# Patient Record
Sex: FEMALE | Race: WHITE | Employment: FULL TIME | URBAN - METROPOLITAN AREA
[De-identification: names, ages, dates, MRNs, and addresses within clinical notes are randomized per-mention and may not be internally consistent; named-entity substitution may affect disease eponyms.]

---

## 2019-02-25 ENCOUNTER — TELEPHONE (OUTPATIENT)
Dept: OBGYN CLINIC | Facility: CLINIC | Age: 34
End: 2019-02-25

## 2019-02-25 NOTE — TELEPHONE ENCOUNTER
Pt calling to report +HPT and LMP of 1/20. Pt stated her cycles are usually every 28-36 days. Pt is not on a PNV yet and pt was advised to start pnv with dha, folic acid, and iron.  Pt informed we have male and female providers and she will see all of them

## 2019-03-01 ENCOUNTER — OFFICE VISIT (OUTPATIENT)
Dept: OBGYN CLINIC | Facility: CLINIC | Age: 34
End: 2019-03-01
Payer: COMMERCIAL

## 2019-03-01 VITALS — SYSTOLIC BLOOD PRESSURE: 117 MMHG | HEART RATE: 78 BPM | WEIGHT: 151.63 LBS | DIASTOLIC BLOOD PRESSURE: 76 MMHG

## 2019-03-01 DIAGNOSIS — Z32.00 PREGNANCY EXAMINATION OR TEST, PREGNANCY UNCONFIRMED: ICD-10-CM

## 2019-03-01 DIAGNOSIS — O36.80X0 PREGNANCY WITH UNCERTAIN FETAL VIABILITY, SINGLE OR UNSPECIFIED FETUS: Primary | ICD-10-CM

## 2019-03-01 LAB
CONTROL LINE PRESENT WITH A CLEAR BACKGROUND (YES/NO): YES YES/NO
KIT LOT #: NORMAL NUMERIC
PREGNANCY TEST, URINE: YES

## 2019-03-01 PROCEDURE — 99202 OFFICE O/P NEW SF 15 MIN: CPT | Performed by: OBSTETRICS & GYNECOLOGY

## 2019-03-01 PROCEDURE — 81025 URINE PREGNANCY TEST: CPT | Performed by: OBSTETRICS & GYNECOLOGY

## 2019-03-04 NOTE — PROGRESS NOTES
Ambrose Bajwa is a 34year old female  Patient's last menstrual period was 2019. Patient presents with:  Gyn Problem: POSITIVE PREGNANCY TEST- NEW PATIENT    New pt. Newly pregnant. LMP 19. EGA 5+ wk. Menses q 28-36 days.   No bleedi

## 2019-03-06 ENCOUNTER — TELEPHONE (OUTPATIENT)
Dept: OBGYN CLINIC | Facility: CLINIC | Age: 34
End: 2019-03-06

## 2019-03-06 NOTE — TELEPHONE ENCOUNTER
Pt states she was feeling nauseous today. Was able to eat and drink a little bit today. About and hour and a half ago she ate some crackers and vomited. Pt states she tried to drink a little water and threw that up as well. Pt denies any fever. Pt states she did eat a popsicle after vomiting and has kept that down so far. Pt instructed to wait a few hours before trying to eat or drink anything in case her stomach is still irritated. Pt told to then try to drink small sips of anything she can keep down. If she is unable to keep down any liquids for about 8 hours, pt instructed to call us back. Pt also given instructions on taking vitamin B6 25mg 4 times a day and 1/2 tab of unisom for the nausea. Message to [facility] on call for any further recs and sign off.

## 2019-03-07 NOTE — TELEPHONE ENCOUNTER
Paged while on call. Returned page. Pt with vomiting today. She has vomited three times this evening. Reviewed for her to rest and give her stomach a break. She feels fine. If she continues to vomit or is feeling dehydrated can go to ER for IVFs.

## 2019-03-07 NOTE — TELEPHONE ENCOUNTER
Called pt for update. Pt stated that she feels much better then when she spoke with Parish Triplett last night. Pt stated she has been drinking gatorade which has been helping and she has been able to keep that down. Pt stated she has also eaten \"dry waffles\".  [facility]

## 2019-03-08 ENCOUNTER — HOSPITAL ENCOUNTER (OUTPATIENT)
Dept: ULTRASOUND IMAGING | Age: 34
Discharge: HOME | End: 2019-03-08
Attending: OBSTETRICS & GYNECOLOGY
Payer: COMMERCIAL

## 2019-03-08 DIAGNOSIS — O36.80X0 PREGNANCY WITH UNCERTAIN FETAL VIABILITY, SINGLE OR UNSPECIFIED FETUS: ICD-10-CM

## 2019-03-08 PROCEDURE — 76817 TRANSVAGINAL US OBSTETRIC: CPT | Performed by: OBSTETRICS & GYNECOLOGY

## 2019-03-08 PROCEDURE — 76801 OB US < 14 WKS SINGLE FETUS: CPT | Performed by: OBSTETRICS & GYNECOLOGY

## 2019-03-19 ENCOUNTER — TELEPHONE (OUTPATIENT)
Dept: OBGYN CLINIC | Facility: CLINIC | Age: 34
End: 2019-03-19

## 2019-03-19 NOTE — TELEPHONE ENCOUNTER
Pt states she feels \"much much better and I think I had a bug because it only lasted for 2 days\". Informed pt her JEZ 10/26/19. Pt has OBN scheduled for 4/6/19 at 10am. Pt will be 11wks on 4/6/19.  Pt states she can only do evening appts after 4pm or Satu

## 2019-03-19 NOTE — TELEPHONE ENCOUNTER
----- Message from Ligia Kirk MD sent at 3/19/2019 10:48 AM CDT -----  Call for wellness check-- noticed in chart that she had morning sickness.   Don't see any future appt

## 2019-03-23 ENCOUNTER — NURSE ONLY (OUTPATIENT)
Dept: OBGYN CLINIC | Facility: CLINIC | Age: 34
End: 2019-03-23
Payer: COMMERCIAL

## 2019-03-23 VITALS — BODY MASS INDEX: 26.84 KG/M2 | WEIGHT: 153.38 LBS | HEIGHT: 63.5 IN

## 2019-03-23 DIAGNOSIS — Z34.81 ENCOUNTER FOR SUPERVISION OF OTHER NORMAL PREGNANCY IN FIRST TRIMESTER: Primary | ICD-10-CM

## 2019-03-23 NOTE — PROGRESS NOTES
Pt seen for OBN appt today with no complaints. 1 hr gtt (GTN DM hx), hep C and Normal PN labs ordered. Pt advised all labs must be completed and resulted prior to MD appt.   Assisted pt with scheduling NPN appt with MD.    Robet Apgar name is John Ortiz child with birth defects not listed above No    Previous miscarriages or stillborn No        MISC    Infant vaccinations  Yes

## 2019-03-31 ENCOUNTER — LAB ENCOUNTER (OUTPATIENT)
Dept: LAB | Facility: HOSPITAL | Age: 34
End: 2019-03-31
Attending: OBSTETRICS & GYNECOLOGY
Payer: COMMERCIAL

## 2019-03-31 DIAGNOSIS — Z34.81 ENCOUNTER FOR SUPERVISION OF OTHER NORMAL PREGNANCY IN FIRST TRIMESTER: ICD-10-CM

## 2019-03-31 PROCEDURE — 87086 URINE CULTURE/COLONY COUNT: CPT

## 2019-03-31 PROCEDURE — 86803 HEPATITIS C AB TEST: CPT

## 2019-03-31 PROCEDURE — 86900 BLOOD TYPING SEROLOGIC ABO: CPT

## 2019-03-31 PROCEDURE — 87340 HEPATITIS B SURFACE AG IA: CPT

## 2019-03-31 PROCEDURE — 82950 GLUCOSE TEST: CPT

## 2019-03-31 PROCEDURE — 86780 TREPONEMA PALLIDUM: CPT

## 2019-03-31 PROCEDURE — 86850 RBC ANTIBODY SCREEN: CPT

## 2019-03-31 PROCEDURE — 87389 HIV-1 AG W/HIV-1&-2 AB AG IA: CPT

## 2019-03-31 PROCEDURE — 86901 BLOOD TYPING SEROLOGIC RH(D): CPT

## 2019-03-31 PROCEDURE — 86762 RUBELLA ANTIBODY: CPT

## 2019-03-31 PROCEDURE — 85025 COMPLETE CBC W/AUTO DIFF WBC: CPT

## 2019-03-31 PROCEDURE — 36415 COLL VENOUS BLD VENIPUNCTURE: CPT

## 2019-04-08 ENCOUNTER — INITIAL PRENATAL (OUTPATIENT)
Dept: OBGYN CLINIC | Facility: CLINIC | Age: 34
End: 2019-04-08
Payer: COMMERCIAL

## 2019-04-08 ENCOUNTER — TELEPHONE (OUTPATIENT)
Dept: OBGYN CLINIC | Facility: CLINIC | Age: 34
End: 2019-04-08

## 2019-04-08 VITALS
WEIGHT: 154 LBS | BODY MASS INDEX: 27 KG/M2 | HEART RATE: 78 BPM | DIASTOLIC BLOOD PRESSURE: 72 MMHG | SYSTOLIC BLOOD PRESSURE: 110 MMHG

## 2019-04-08 DIAGNOSIS — Z12.4 SCREENING FOR MALIGNANT NEOPLASM OF CERVIX: ICD-10-CM

## 2019-04-08 DIAGNOSIS — Z34.91 ENCOUNTER FOR SUPERVISION OF NORMAL PREGNANCY IN FIRST TRIMESTER, UNSPECIFIED GRAVIDITY: Primary | ICD-10-CM

## 2019-04-08 PROCEDURE — 81002 URINALYSIS NONAUTO W/O SCOPE: CPT | Performed by: OBSTETRICS & GYNECOLOGY

## 2019-04-09 NOTE — TELEPHONE ENCOUNTER
PT WANTS A NOTE REQUESTING THAT IT IS OK FOR PT TO GET UP EVERY HOUR OR SO TO MOVE AROUND AND GO TO THE BATHROOM FOR WORK. PT WOULD LIKE NOTE TO BE SENT THROUGH [employer].  NEEDED BY 4/9/19 @ 9:00AM

## 2019-04-14 ENCOUNTER — PATIENT MESSAGE (OUTPATIENT)
Dept: OBGYN CLINIC | Facility: CLINIC | Age: 34
End: 2019-04-14

## 2019-04-14 DIAGNOSIS — Z3A.12 12 WEEKS GESTATION OF PREGNANCY: Primary | ICD-10-CM

## 2019-04-15 NOTE — TELEPHONE ENCOUNTER
From: Garrett Shone  To: Rudy Peace MD  Sent: 4/14/2019 1:26 PM CDT  Subject: Non-Urgent Medical Question    If not too late, we'd like to schedule for the genetics testing. Please let me know if there is time to still do so.  Also if it is still poss

## 2019-04-16 ENCOUNTER — TELEPHONE (OUTPATIENT)
Dept: OBGYN CLINIC | Facility: CLINIC | Age: 34
End: 2019-04-16

## 2019-04-16 NOTE — TELEPHONE ENCOUNTER
Pt wants to know if genetic testing and 1st trimester screening are the same. She needs to schedule for 1st tri screening.

## 2019-04-16 NOTE — TELEPHONE ENCOUNTER
I CALLED AND SPOKE WITH PT AND EXPLAINED HER INSURANCE ONLY COVERS [employer]. THERE MAY BE OTHERS BUT THEY ARE MORE THAN 76 MILES AWAY.   ALSO EXPLAINED THAT SINCE SHE DOES NOT HAVE ANY SIGNIFICANT HEALTH HISTORY TO WARRANT DOING FTS FOR A SPECIFIC

## 2019-04-16 NOTE — TELEPHONE ENCOUNTER
Joon from [facility] states they cannot take pt in for MFM consult for first trimester screening.  No appt available until after next week and pt needs to get testing done soon

## 2019-04-16 NOTE — TELEPHONE ENCOUNTER
If the pt's pcp is out of [facility] she can go there, otherwise she will have to contact her insurance for [employer] [facility] specialists that accept her insurance.

## 2019-05-08 ENCOUNTER — ROUTINE PRENATAL (OUTPATIENT)
Dept: OBGYN CLINIC | Facility: CLINIC | Age: 34
End: 2019-05-08
Payer: COMMERCIAL

## 2019-05-08 VITALS
BODY MASS INDEX: 27 KG/M2 | WEIGHT: 155 LBS | SYSTOLIC BLOOD PRESSURE: 107 MMHG | DIASTOLIC BLOOD PRESSURE: 68 MMHG | HEART RATE: 71 BPM

## 2019-05-08 DIAGNOSIS — Z34.92 ENCOUNTER FOR SUPERVISION OF NORMAL PREGNANCY IN SECOND TRIMESTER, UNSPECIFIED GRAVIDITY: Primary | ICD-10-CM

## 2019-05-08 PROCEDURE — 81002 URINALYSIS NONAUTO W/O SCOPE: CPT | Performed by: OBSTETRICS & GYNECOLOGY

## 2019-05-09 PROBLEM — Z86.32 HISTORY OF GESTATIONAL DIABETES: Status: ACTIVE | Noted: 2019-05-09

## 2019-06-07 ENCOUNTER — HOSPITAL ENCOUNTER (OUTPATIENT)
Dept: ULTRASOUND IMAGING | Facility: HOSPITAL | Age: 34
Discharge: HOME | End: 2019-06-07
Attending: OBSTETRICS & GYNECOLOGY
Payer: COMMERCIAL

## 2019-06-07 ENCOUNTER — PATIENT MESSAGE (OUTPATIENT)
Dept: OBGYN CLINIC | Facility: CLINIC | Age: 34
End: 2019-06-07

## 2019-06-07 DIAGNOSIS — Z34.92 ENCOUNTER FOR SUPERVISION OF NORMAL PREGNANCY IN SECOND TRIMESTER, UNSPECIFIED GRAVIDITY: ICD-10-CM

## 2019-06-07 PROCEDURE — 76805 OB US >/= 14 WKS SNGL FETUS: CPT | Performed by: OBSTETRICS & GYNECOLOGY

## 2019-06-08 ENCOUNTER — ROUTINE PRENATAL (OUTPATIENT)
Dept: OBGYN CLINIC | Facility: CLINIC | Age: 34
End: 2019-06-08
Payer: COMMERCIAL

## 2019-06-08 VITALS
BODY MASS INDEX: 28 KG/M2 | WEIGHT: 159.63 LBS | HEART RATE: 82 BPM | DIASTOLIC BLOOD PRESSURE: 73 MMHG | SYSTOLIC BLOOD PRESSURE: 110 MMHG

## 2019-06-08 DIAGNOSIS — Z34.92 ENCOUNTER FOR SUPERVISION OF NORMAL PREGNANCY IN SECOND TRIMESTER, UNSPECIFIED GRAVIDITY: Primary | ICD-10-CM

## 2019-06-08 PROCEDURE — 81002 URINALYSIS NONAUTO W/O SCOPE: CPT | Performed by: OBSTETRICS & GYNECOLOGY

## 2019-06-08 NOTE — TELEPHONE ENCOUNTER
From: Jermaine King  To: Alexis Torres MD  Sent: 6/7/2019 6:29 PM CDT  Subject: Other    I have a follow up visit tomorrow morning at 10:15 and I had the ultrasound done this evening.  I believe I was supposed to send a message so they can be pulled for dionisio

## 2019-06-18 ENCOUNTER — PATIENT MESSAGE (OUTPATIENT)
Dept: OBGYN CLINIC | Facility: CLINIC | Age: 34
End: 2019-06-18

## 2019-06-19 NOTE — TELEPHONE ENCOUNTER
From: Uziel Membreno  To: John Richardson MD  Sent: 6/18/2019 9:48 PM CDT  Subject: Other    I am starting to get bad sciatica pain throughout the day and was hoping that there is something I can do to alleviate the pain.  The only thing I've tried was a heatin

## 2019-07-03 ENCOUNTER — ROUTINE PRENATAL (OUTPATIENT)
Dept: OBGYN CLINIC | Facility: CLINIC | Age: 34
End: 2019-07-03
Payer: COMMERCIAL

## 2019-07-03 VITALS
DIASTOLIC BLOOD PRESSURE: 67 MMHG | HEART RATE: 69 BPM | SYSTOLIC BLOOD PRESSURE: 109 MMHG | BODY MASS INDEX: 28 KG/M2 | WEIGHT: 161 LBS

## 2019-07-03 DIAGNOSIS — Z34.92 ENCOUNTER FOR SUPERVISION OF NORMAL PREGNANCY IN SECOND TRIMESTER, UNSPECIFIED GRAVIDITY: Primary | ICD-10-CM

## 2019-07-03 LAB
APPEARANCE: CLEAR
MULTISTIX LOT#: ABNORMAL NUMERIC
SPECIFIC GRAVITY: 1 (ref 1–1.03)
URINE-COLOR: YELLOW

## 2019-07-03 PROCEDURE — 81002 URINALYSIS NONAUTO W/O SCOPE: CPT | Performed by: OBSTETRICS & GYNECOLOGY

## 2019-07-20 ENCOUNTER — APPOINTMENT (OUTPATIENT)
Dept: LAB | Facility: HOSPITAL | Age: 34
End: 2019-07-20
Attending: OBSTETRICS & GYNECOLOGY
Payer: COMMERCIAL

## 2019-07-20 DIAGNOSIS — Z34.92 ENCOUNTER FOR SUPERVISION OF NORMAL PREGNANCY IN SECOND TRIMESTER, UNSPECIFIED GRAVIDITY: ICD-10-CM

## 2019-07-20 LAB
DEPRECATED RDW RBC AUTO: 44.4 FL (ref 35.1–46.3)
ERYTHROCYTE [DISTWIDTH] IN BLOOD BY AUTOMATED COUNT: 13 % (ref 11–15)
GLUCOSE 1H P GLC SERPL-MCNC: 88 MG/DL
HCT VFR BLD AUTO: 34.7 % (ref 35–48)
HGB BLD-MCNC: 11.5 G/DL (ref 12–16)
MCH RBC QN AUTO: 31.2 PG (ref 26–34)
MCHC RBC AUTO-ENTMCNC: 33.1 G/DL (ref 31–37)
MCV RBC AUTO: 94 FL (ref 80–100)
PLATELET # BLD AUTO: 332 10(3)UL (ref 150–450)
RBC # BLD AUTO: 3.69 X10(6)UL (ref 3.8–5.3)
WBC # BLD AUTO: 9.7 X10(3) UL (ref 4–11)

## 2019-07-20 PROCEDURE — 82950 GLUCOSE TEST: CPT

## 2019-07-20 PROCEDURE — 36415 COLL VENOUS BLD VENIPUNCTURE: CPT

## 2019-07-20 PROCEDURE — 85027 COMPLETE CBC AUTOMATED: CPT

## 2019-07-29 ENCOUNTER — HOSPITAL ENCOUNTER (INPATIENT)
Facility: HOSPITAL | Age: 34
LOS: 1 days | Discharge: HOME | End: 2019-07-30
Attending: OBSTETRICS & GYNECOLOGY | Admitting: OBSTETRICS & GYNECOLOGY
Payer: COMMERCIAL

## 2019-07-29 ENCOUNTER — PATIENT MESSAGE (OUTPATIENT)
Dept: OBGYN CLINIC | Facility: CLINIC | Age: 34
End: 2019-07-29

## 2019-07-29 PROBLEM — Z34.90 PREGNANCY: Status: ACTIVE | Noted: 2019-07-29

## 2019-07-29 PROBLEM — O36.4XX0 FETAL DEMISE IN SINGLETON PREGNANCY GREATER THAN 22 WEEKS GESTATION, ANTEPARTUM: Status: ACTIVE | Noted: 2019-07-29

## 2019-07-29 LAB
ANTIBODY SCREEN: NEGATIVE
BASOPHILS # BLD AUTO: 0.04 X10(3) UL (ref 0–0.2)
BASOPHILS NFR BLD AUTO: 0.4 %
BILIRUB UR QL: NEGATIVE
CLARITY UR: CLEAR
COLOR UR: YELLOW
DEPRECATED RDW RBC AUTO: 43.8 FL (ref 35.1–46.3)
EOSINOPHIL # BLD AUTO: 0.12 X10(3) UL (ref 0–0.7)
EOSINOPHIL NFR BLD AUTO: 1.1 %
ERYTHROCYTE [DISTWIDTH] IN BLOOD BY AUTOMATED COUNT: 13.1 % (ref 11–15)
GLUCOSE UR-MCNC: NEGATIVE MG/DL
HCT VFR BLD AUTO: 34.6 % (ref 35–48)
HGB BLD-MCNC: 11.7 G/DL (ref 12–16)
HGB UR QL STRIP.AUTO: NEGATIVE
IMM GRANULOCYTES # BLD AUTO: 0.05 X10(3) UL (ref 0–1)
IMM GRANULOCYTES NFR BLD: 0.5 %
KETONES UR-MCNC: NEGATIVE MG/DL
LYMPHOCYTES # BLD AUTO: 2.44 X10(3) UL (ref 1–4)
LYMPHOCYTES NFR BLD AUTO: 23.1 %
MCH RBC QN AUTO: 31.2 PG (ref 26–34)
MCHC RBC AUTO-ENTMCNC: 33.8 G/DL (ref 31–37)
MCV RBC AUTO: 92.3 FL (ref 80–100)
MONOCYTES # BLD AUTO: 0.68 X10(3) UL (ref 0.1–1)
MONOCYTES NFR BLD AUTO: 6.5 %
NEUTROPHILS # BLD AUTO: 7.21 X10 (3) UL (ref 1.5–7.7)
NEUTROPHILS # BLD AUTO: 7.21 X10(3) UL (ref 1.5–7.7)
NEUTROPHILS NFR BLD AUTO: 68.4 %
NITRITE UR QL STRIP.AUTO: NEGATIVE
PH UR: 8 [PH] (ref 5–8)
PLATELET # BLD AUTO: 336 10(3)UL (ref 150–450)
PROT UR-MCNC: NEGATIVE MG/DL
RBC # BLD AUTO: 3.75 X10(6)UL (ref 3.8–5.3)
RBC #/AREA URNS AUTO: 1 /HPF
RH BLOOD TYPE: POSITIVE
SP GR UR STRIP: 1.01 (ref 1–1.03)
UROBILINOGEN UR STRIP-ACNC: <2
VIT C UR-MCNC: NEGATIVE MG/DL
WBC # BLD AUTO: 10.5 X10(3) UL (ref 4–11)
WBC #/AREA URNS AUTO: 1 /HPF

## 2019-07-29 RX ORDER — DEXTROSE, SODIUM CHLORIDE, SODIUM LACTATE, POTASSIUM CHLORIDE, AND CALCIUM CHLORIDE 5; .6; .31; .03; .02 G/100ML; G/100ML; G/100ML; G/100ML; G/100ML
INJECTION, SOLUTION INTRAVENOUS CONTINUOUS
Status: DISCONTINUED | OUTPATIENT
Start: 2019-07-29 | End: 2019-07-30

## 2019-07-29 RX ORDER — SODIUM CHLORIDE 0.9 % (FLUSH) 0.9 %
10 SYRINGE (ML) INJECTION AS NEEDED
Status: DISCONTINUED | OUTPATIENT
Start: 2019-07-29 | End: 2019-07-30

## 2019-07-29 RX ORDER — IBUPROFEN 600 MG/1
600 TABLET ORAL ONCE AS NEEDED
Status: DISCONTINUED | OUTPATIENT
Start: 2019-07-29 | End: 2019-07-30

## 2019-07-29 RX ORDER — SODIUM CHLORIDE, SODIUM LACTATE, POTASSIUM CHLORIDE, CALCIUM CHLORIDE 600; 310; 30; 20 MG/100ML; MG/100ML; MG/100ML; MG/100ML
INJECTION, SOLUTION INTRAVENOUS CONTINUOUS
Status: DISCONTINUED | OUTPATIENT
Start: 2019-07-29 | End: 2019-07-30

## 2019-07-29 RX ORDER — AMMONIA INHALANTS 0.04 G/.3ML
0.3 INHALANT RESPIRATORY (INHALATION) AS NEEDED
Status: DISCONTINUED | OUTPATIENT
Start: 2019-07-29 | End: 2019-07-30

## 2019-07-29 RX ORDER — ZOLPIDEM TARTRATE 5 MG/1
5 TABLET ORAL NIGHTLY PRN
Status: DISCONTINUED | OUTPATIENT
Start: 2019-07-29 | End: 2019-07-30

## 2019-07-29 RX ORDER — ONDANSETRON 2 MG/ML
4 INJECTION INTRAMUSCULAR; INTRAVENOUS EVERY 6 HOURS PRN
Status: DISCONTINUED | OUTPATIENT
Start: 2019-07-29 | End: 2019-07-30

## 2019-07-29 RX ORDER — LIDOCAINE HYDROCHLORIDE 10 MG/ML
30 INJECTION, SOLUTION EPIDURAL; INFILTRATION; INTRACAUDAL; PERINEURAL ONCE
Status: DISCONTINUED | OUTPATIENT
Start: 2019-07-29 | End: 2019-07-30

## 2019-07-29 RX ORDER — TRISODIUM CITRATE DIHYDRATE AND CITRIC ACID MONOHYDRATE 500; 334 MG/5ML; MG/5ML
30 SOLUTION ORAL AS NEEDED
Status: DISCONTINUED | OUTPATIENT
Start: 2019-07-29 | End: 2019-07-30

## 2019-07-29 RX ORDER — DEXTROSE, SODIUM CHLORIDE, SODIUM LACTATE, POTASSIUM CHLORIDE, AND CALCIUM CHLORIDE 5; .6; .31; .03; .02 G/100ML; G/100ML; G/100ML; G/100ML; G/100ML
INJECTION, SOLUTION INTRAVENOUS AS NEEDED
Status: DISCONTINUED | OUTPATIENT
Start: 2019-07-29 | End: 2019-07-30

## 2019-07-29 RX ORDER — ALPRAZOLAM 0.5 MG/1
0.5 TABLET ORAL 3 TIMES DAILY PRN
Status: DISCONTINUED | OUTPATIENT
Start: 2019-07-29 | End: 2019-07-30

## 2019-07-29 NOTE — TELEPHONE ENCOUNTER
Pt is 27w2d, reports she has not felt baby move today at all and does not recall if she felt baby movements yesterday. States yesterday she was at home doing laundry and house work and did not really pay attention.  States today she was at work and did not

## 2019-07-29 NOTE — TELEPHONE ENCOUNTER
From: Sofie Turner  To: Luh Banda DO  Sent: 7/29/2019 5:44 PM CDT  Subject: Other    Good evening,    I may just be over worried here but I just wanted to double check on something. I normally feel the baby move and kick around a lot.  Yesterday I

## 2019-07-30 ENCOUNTER — ANESTHESIA (OUTPATIENT)
Dept: OBGYN UNIT | Facility: HOSPITAL | Age: 34
End: 2019-07-30
Payer: COMMERCIAL

## 2019-07-30 ENCOUNTER — ANESTHESIA EVENT (OUTPATIENT)
Dept: OBGYN UNIT | Facility: HOSPITAL | Age: 34
End: 2019-07-30
Payer: COMMERCIAL

## 2019-07-30 VITALS
OXYGEN SATURATION: 97 % | SYSTOLIC BLOOD PRESSURE: 110 MMHG | HEART RATE: 80 BPM | RESPIRATION RATE: 19 BRPM | TEMPERATURE: 99 F | DIASTOLIC BLOOD PRESSURE: 59 MMHG

## 2019-07-30 LAB
AMPHET UR QL SCN: NEGATIVE
CANNABINOIDS UR QL SCN: NEGATIVE
COCAINE UR QL: NEGATIVE
EST. AVERAGE GLUCOSE BLD GHB EST-MCNC: 103 MG/DL (ref 68–126)
FIBRINOGEN PPP-MCNC: 530 MG/DL (ref 176–491)
FSP PPP LA-ACNC: NEGATIVE MCG/ML
HBA1C MFR BLD HPLC: 5.2 % (ref ?–5.7)
HGB F MFR BLD KLEIH BETKE: <0.1 %
MDMA UR QL SCN: NEGATIVE
OPIATES UR QL SCN: NEGATIVE
OXYCODONE UR QL SCN: NEGATIVE
PCP UR QL SCN: NEGATIVE

## 2019-07-30 PROCEDURE — 3E0P7VZ INTRODUCTION OF HORMONE INTO FEMALE REPRODUCTIVE, VIA NATURAL OR ARTIFICIAL OPENING: ICD-10-PCS | Performed by: OBSTETRICS & GYNECOLOGY

## 2019-07-30 PROCEDURE — 59400 OBSTETRICAL CARE: CPT | Performed by: OBSTETRICS & GYNECOLOGY

## 2019-07-30 RX ORDER — HYDROXYZINE HYDROCHLORIDE 50 MG/ML
50 INJECTION, SOLUTION INTRAMUSCULAR EVERY 4 HOURS PRN
Status: DISCONTINUED | OUTPATIENT
Start: 2019-07-30 | End: 2019-07-30

## 2019-07-30 RX ORDER — BUPIVACAINE HYDROCHLORIDE 2.5 MG/ML
15 INJECTION, SOLUTION EPIDURAL; INFILTRATION; INTRACAUDAL ONCE
Status: DISCONTINUED | OUTPATIENT
Start: 2019-07-30 | End: 2019-07-31

## 2019-07-30 RX ORDER — NALBUPHINE HCL 10 MG/ML
5 AMPUL (ML) INJECTION
Status: DISCONTINUED | OUTPATIENT
Start: 2019-07-30 | End: 2019-07-30

## 2019-07-30 RX ORDER — SODIUM CHLORIDE 0.9 % (FLUSH) 0.9 %
10 SYRINGE (ML) INJECTION AS NEEDED
Status: DISCONTINUED | OUTPATIENT
Start: 2019-07-30 | End: 2019-07-31

## 2019-07-30 RX ORDER — LIDOCAINE HYDROCHLORIDE AND EPINEPHRINE 20; 5 MG/ML; UG/ML
20 INJECTION, SOLUTION EPIDURAL; INFILTRATION; INTRACAUDAL; PERINEURAL ONCE
Status: DISCONTINUED | OUTPATIENT
Start: 2019-07-30 | End: 2019-07-31

## 2019-07-30 RX ORDER — ACETAMINOPHEN 325 MG/1
650 TABLET ORAL EVERY 6 HOURS PRN
Status: DISCONTINUED | OUTPATIENT
Start: 2019-07-30 | End: 2019-07-30

## 2019-07-30 RX ORDER — LIDOCAINE HYDROCHLORIDE AND EPINEPHRINE 15; 5 MG/ML; UG/ML
INJECTION, SOLUTION EPIDURAL AS NEEDED
Status: DISCONTINUED | OUTPATIENT
Start: 2019-07-30 | End: 2019-07-30 | Stop reason: SURG

## 2019-07-30 RX ORDER — NALBUPHINE HCL 10 MG/ML
10 AMPUL (ML) INJECTION
Status: DISCONTINUED | OUTPATIENT
Start: 2019-07-30 | End: 2019-07-30

## 2019-07-30 RX ORDER — BISACODYL 10 MG
10 SUPPOSITORY, RECTAL RECTAL ONCE AS NEEDED
Status: DISCONTINUED | OUTPATIENT
Start: 2019-07-30 | End: 2019-07-31

## 2019-07-30 RX ORDER — MISOPROSTOL 200 UG/1
TABLET ORAL
Status: COMPLETED
Start: 2019-07-30 | End: 2019-07-30

## 2019-07-30 RX ORDER — CHOLECALCIFEROL (VITAMIN D3) 25 MCG
1 TABLET,CHEWABLE ORAL DAILY
Status: DISCONTINUED | OUTPATIENT
Start: 2019-07-30 | End: 2019-07-31

## 2019-07-30 RX ORDER — MISOPROSTOL 200 UG/1
200 TABLET ORAL EVERY 6 HOURS SCHEDULED
Status: DISCONTINUED | OUTPATIENT
Start: 2019-07-30 | End: 2019-07-30

## 2019-07-30 RX ORDER — DIAPER,BRIEF,INFANT-TODD,DISP
1 EACH MISCELLANEOUS EVERY 6 HOURS PRN
Status: DISCONTINUED | OUTPATIENT
Start: 2019-07-30 | End: 2019-07-31

## 2019-07-30 RX ORDER — DOCUSATE SODIUM 100 MG/1
100 CAPSULE, LIQUID FILLED ORAL 2 TIMES DAILY
Status: DISCONTINUED | OUTPATIENT
Start: 2019-07-30 | End: 2019-07-31

## 2019-07-30 RX ORDER — SIMETHICONE 80 MG
80 TABLET,CHEWABLE ORAL 3 TIMES DAILY PRN
Status: DISCONTINUED | OUTPATIENT
Start: 2019-07-30 | End: 2019-07-31

## 2019-07-30 RX ORDER — NALBUPHINE HCL 10 MG/ML
2.5 AMPUL (ML) INJECTION
Status: DISCONTINUED | OUTPATIENT
Start: 2019-07-30 | End: 2019-07-31

## 2019-07-30 RX ORDER — NALBUPHINE HCL 10 MG/ML
5 AMPUL (ML) INJECTION ONCE
Status: COMPLETED | OUTPATIENT
Start: 2019-07-30 | End: 2019-07-30

## 2019-07-30 RX ORDER — AMMONIA INHALANTS 0.04 G/.3ML
0.3 INHALANT RESPIRATORY (INHALATION) AS NEEDED
Status: DISCONTINUED | OUTPATIENT
Start: 2019-07-30 | End: 2019-07-31

## 2019-07-30 RX ORDER — ONDANSETRON 2 MG/ML
4 INJECTION INTRAMUSCULAR; INTRAVENOUS EVERY 6 HOURS PRN
Status: DISCONTINUED | OUTPATIENT
Start: 2019-07-30 | End: 2019-07-31

## 2019-07-30 RX ORDER — EPHEDRINE SULFATE/0.9% NACL/PF 25 MG/5 ML
5 SYRINGE (ML) INTRAVENOUS AS NEEDED
Status: DISCONTINUED | OUTPATIENT
Start: 2019-07-30 | End: 2019-07-31

## 2019-07-30 RX ADMIN — LIDOCAINE HYDROCHLORIDE AND EPINEPHRINE 3 ML: 15; 5 INJECTION, SOLUTION EPIDURAL at 13:34:00

## 2019-07-30 RX ADMIN — LIDOCAINE HYDROCHLORIDE AND EPINEPHRINE 3 ML: 15; 5 INJECTION, SOLUTION EPIDURAL at 13:33:00

## 2019-07-30 NOTE — L&D DELIVERY NOTE
[facility]    Vaginal Delivery Note    Rad Bailey Patient Status:  Inpatient    1985 MRN [id number]   Location [address]  Attending Kristin Saldaña MD   Hosp Day # 1 PCP None Pcp     Delivery     Infa

## 2019-07-30 NOTE — H&P
[address] Patient Status:  Inpatient    1985 MRN [id number]   Location [address] [place] [address] Attending Shellie Collins MD   Hosp Day # 0 PCP None Pcp     Date of Admission: OR) Take by mouth daily.  Disp:  Rfl:  7/29/2019 at Unknown time       Review of Systems:   As documented in HPI      Physical Exam:        Constitutional: alert and cooperative in moderate-severe distress as appropriate  Psych:  Alert and oriented  Skin:

## 2019-07-30 NOTE — PROGRESS NOTES
[facility]    Labor Progress Note    Beronica Hardin Patient Status:  Inpatient    1985 MRN [id number]   Location [address]  Attending Renée Restrepo MD   Hosp Day # 1 PCP None Pcp       Subjective   Inte

## 2019-07-30 NOTE — ANESTHESIA PREPROCEDURE EVALUATION
Anesthesia PreOp Note    HPI:     Dennis Stephens is a 34year old female who presents for preoperative consultation requested by: * No surgeons listed *    Date of Surgery: 7/30/2019    * No procedures listed *  Indication: * No pre-op diagnosis entered * Santa Baum MD    lidocaine-EPINEPHrine 2 %-1:[id number] injection 20 mL 20 mL Epidural Once Karolynn Peabody, MD    Bupivacaine HCl (PF) (MARCAINE) 0.25 % injection 15 mL 15 mL Epidural Once Karolynn Peabody, MD    dextrose 5 % /lactated ringers infusion  Tirna Horowitz date: 2013        Years since quittin.5      Smokeless tobacco: Never Used    Substance and Sexual Activity      Alcohol use: No        Frequency: Never      Drug use: No      Sexual activity: Not on file        Comment: NONE    Lifestyle      Phys GI/Hepatic/Renal      Endo/Other    (+) diabetes mellitus,   Abdominal                Anesthesia Plan:   ASA:  2  Plan:   Epidural  Informed Consent Plan and Risks Discussed With:  Patient      I have informed Kenneth Johnson and/or legal guardian or family

## 2019-07-30 NOTE — ANESTHESIA PROCEDURE NOTES
Labor Analgesia  Performed by: Harsh Johnson MD  Authorized by: Harsh Johnson MD     Patient Location:  OB  Start Time:  7/30/2019 1:25 PM  End Time:  7/30/2019 1:36 PM  Reason for Block: labor epidural    Anesthesiologist:  Harsh Johnson MD  Performed

## 2019-07-30 NOTE — PAYOR COMM NOTE
--------------  ADMISSION REVIEW     Payor: [address] [place] [address] Road #:  [id number]  Authorization Number: PENDING    Admit date: 7/29/19  Admit time: 2144       Admitting Physician: Triston Rios MD  Attending Physician:  Triston Rios Surgerical History: No past surgical history on file. Family History:  No pertinent family history  Allergies/Medications:    Allergies:   Penicillins             FEVER, SWELLING, Tightness in Chest    Comment:swelling    Medications:  Medications Prior

## 2019-07-30 NOTE — DISCHARGE SUMMARY
[facility]    Discharge Summary    Rad Bailey Patient Status:  Inpatient    1985 MRN [id number]   Location [address]  Attending Kristin Saldaña MD   Hosp Day # 1       Admit date:  2019    Disc

## 2019-07-30 NOTE — PROGRESS NOTES
IN HOUSE OB/GYN Progress Note    I was called by RN to evaluate Dr. Dafne Morgan patient who presented with decreased fetal movement. They were unable to hear FHT's. Bedside US showed no FHT's consisten with fetal demise.   I contacted Dr. Zion Holley and notified her

## 2019-07-30 NOTE — PLAN OF CARE
Problem: Patient Centered Care  Goal: Patient preferences are identified and integrated in the patient's plan of care  Description  Interventions:  - What would you like us to know as we care for you?   - Provide timely, complete, and accurate informatio non-pharmacological measures as appropriate and evaluate response  - Consider cultural and social influences on pain and pain management  - Manage/alleviate anxiety  - Utilize distraction and/or relaxation techniques  - Monitor for opioid side effects  - N

## 2019-07-30 NOTE — ANESTHESIA POSTPROCEDURE EVALUATION
Patient: Ar Robledo    Procedure Summary     Date:  07/30/19 Room / Location:      Anesthesia Start:  1325 Anesthesia Stop:  0877    Procedure:  LABOR ANALGESIA Diagnosis:      Scheduled Providers:   Anesthesiologist:      Anesthesia Type:  epidural AS

## 2019-07-30 NOTE — PROGRESS NOTES
Pt is a 34year old female admitted to TR5/TR5-A. Patient presents with:  Decreased Fetal Movement: Pt has not felt baby move since yesterday     Pt is  27w2d intra-uterine pregnancy. History obtained, consents signed.  Oriented to room, staff, an

## 2019-07-30 NOTE — PLAN OF CARE
Problem: Patient Centered Care  Goal: Patient preferences are identified and integrated in the patient's plan of care  Description  Interventions:  - What would you like us to know as we care for you?  Experiencing fetal demise  - Provide timely, complete

## 2019-07-30 NOTE — PROGRESS NOTES
Dr. Fry Fairly confirmed no fetal heart tones during US. Lonny Frye all Dr. Lori Soni to update her on status of patient.

## 2019-07-31 LAB — T PALLIDUM AB SER QL: NEGATIVE

## 2019-07-31 NOTE — PROGRESS NOTES
Pt D/C home in stable condition. Pt refused wheelchair. D/C instructions given/explained to pt-pt verbalizes understanding. Pt/SO affect appropriate. Fetus wrapped and removed from room and brought to [facility].

## 2019-08-01 NOTE — PAYOR COMM NOTE
--------------  DISCHARGE REVIEW    Payor: [employer]  Subscriber #:  [id number]  Authorization Number: PENDING    Admit date: 7/29/19  Admit time:  2144  Discharge Date: 7/30/2019  9:15 PM     Admitting Physician: Karolynn Peabody, MD  Att soft, non-tender, no rebound  Uterus: firm, nontender, below umbilicus  Pelvic: deferred  Extremities: Homans sign is negative, no sign of DVT    Discharged Condition: stable    Disposition: home    Plan:     Follow-up appointment in 4-6 weeks with Dr. Justina Chavarria

## 2019-08-02 ENCOUNTER — PATIENT MESSAGE (OUTPATIENT)
Dept: OBGYN CLINIC | Facility: CLINIC | Age: 34
End: 2019-08-02

## 2019-08-02 NOTE — TELEPHONE ENCOUNTER
From: Eva Davis  To: Bev Kent MD  Sent: 8/2/2019 11:00 AM CDT  Subject: Other    Hi Dr. Karen Recinos,    I sent a message earlier about the bleeding and I know it was soon but the bleeding has started again.

## 2019-08-02 NOTE — TELEPHONE ENCOUNTER
From: Remedios Galvan  To: Tawanna Henson MD  Sent: 8/2/2019 9:43 AM CDT  Subject: Other    Hi Dr. Lluvia Jeff,    I just want to make sure this is normal. My bleeding has pretty much stopped now.  I did feel very faint cramps this morning and was bleeding when I

## 2019-08-06 ENCOUNTER — TELEPHONE (OUTPATIENT)
Dept: OBGYN UNIT | Facility: HOSPITAL | Age: 34
End: 2019-08-06

## 2019-08-06 NOTE — PROGRESS NOTES
Spoke with patient for Share follow up call. Pt states hat she is doing ok. Keeping busy with older daughter. Pt requesting flash drive of photos be mailed to her, will be mailed today.  Pt encouraged to call back for any grief assistance and/or she does no

## 2019-08-10 ENCOUNTER — PATIENT MESSAGE (OUTPATIENT)
Dept: OBGYN CLINIC | Facility: CLINIC | Age: 34
End: 2019-08-10

## 2019-08-10 DIAGNOSIS — Z87.59 HISTORY OF FETAL DEMISE, NOT CURRENTLY PREGNANT: Primary | ICD-10-CM

## 2019-08-10 NOTE — TELEPHONE ENCOUNTER
From: Fay Lo  To: Machelle Shields MD  Sent: 8/10/2019 7:58 AM CDT  Subject: Other    Hi Dr. Caty Gupta,    I'm sure this may just be a grievance related pain but I just want to be cautious at this point.  Other than mild \"menstrual\" cramping, I've also

## 2019-08-10 NOTE — TELEPHONE ENCOUNTER
Spoke to pt, states stomach pain began yesterday and it was constant. States today the pain is on and off. Describes it to be \"more sharp than dull\". Denies use of ibuprofen or any medications. Reports she is eating normally.  Pt denies nausea, vomiting a

## 2019-08-12 NOTE — TELEPHONE ENCOUNTER
From: Crissy Mcmillan  To: Tory James MD  Sent: 8/10/2019 3:46 PM CDT  Subject: Other    Hi Columba Hartman,    I'm sorry I meant to ask this while on the phone with you but when will I be able to start picking up my 1year old again.  She is about 33 lbs and I

## 2019-08-28 ENCOUNTER — TELEPHONE (OUTPATIENT)
Dept: OBGYN CLINIC | Facility: CLINIC | Age: 34
End: 2019-08-28

## 2019-08-28 NOTE — TELEPHONE ENCOUNTER
Pt would like to know if autopsy results will be available at her post-partum appt tomorrow. Please advise.

## 2019-08-29 ENCOUNTER — POSTPARTUM (OUTPATIENT)
Dept: OBGYN CLINIC | Facility: CLINIC | Age: 34
End: 2019-08-29
Payer: COMMERCIAL

## 2019-08-29 ENCOUNTER — TELEPHONE (OUTPATIENT)
Dept: OBGYN CLINIC | Facility: CLINIC | Age: 34
End: 2019-08-29

## 2019-08-29 VITALS
WEIGHT: 157.81 LBS | SYSTOLIC BLOOD PRESSURE: 105 MMHG | BODY MASS INDEX: 28 KG/M2 | DIASTOLIC BLOOD PRESSURE: 68 MMHG | HEART RATE: 62 BPM

## 2019-08-29 DIAGNOSIS — Z87.59 HISTORY OF STILLBIRTH: ICD-10-CM

## 2019-08-29 PROBLEM — Z34.90 PREGNANCY: Status: RESOLVED | Noted: 2019-07-29 | Resolved: 2019-08-29

## 2019-08-29 PROBLEM — Z86.32 HISTORY OF GESTATIONAL DIABETES: Status: RESOLVED | Noted: 2019-05-09 | Resolved: 2019-08-29

## 2019-08-29 NOTE — TELEPHONE ENCOUNTER
NITO Steele in pathology. Please transfer to me when she calls back. Once we have info on autopsy and genetic screening, please send YaKlasst message to pt with info.

## 2019-08-29 NOTE — PROGRESS NOTES
Sofie Turner is a 30year old female  here for 6 week post-partum visit.   Patient delivered a 32 wk stillbirth female infant on 19 via  -- unknown cause  Patient denies symptoms of depression, Mason  depression scale score discharge  Cervix:    normal without tenderness on motion  Uterus:    normal in size, contour, position, mobility, without tenderness  Adnexa:   normal without masses or tenderness  Perineum:   well healed perineum  Anus:    no hemorroids       ASSESSMENT/

## 2019-08-29 NOTE — TELEPHONE ENCOUNTER
Autopsy scanned into system not complete. Please call pathology to find out when complete report with be finalized. Why does report state \"no request for genetic testing\" -- per pt wished for full autopsy to be done.  Please let pt know when complete auto

## 2019-08-30 NOTE — TELEPHONE ENCOUNTER
Call [facility] called [facility] in regards to messages below. Spoke with Karen Ceballos who stated he is the pathology residents who performed the autopsy.  Karen Ceballos stated that genetic testing can be done, however, it will b

## 2019-09-03 ENCOUNTER — TELEPHONE (OUTPATIENT)
Dept: OBGYN UNIT | Facility: HOSPITAL | Age: 34
End: 2019-09-03

## 2019-09-03 NOTE — PROGRESS NOTES
Spoke with patient for Share follow up. Pt is back to work today, feels ok at work. Pt told people to ask her questions about loss if they have any questions. Pt is waiting to hear back from MD office regarding chromosome testing.  Encouraged patient to osito

## 2019-09-03 NOTE — TELEPHONE ENCOUNTER
MARGARITA CALLED BACK TO SAY THAT THE ENTIRE MICROSCOPIC PROCESS CAN TAKE UP TO 60 DAYS. THEY RECEIVED THE BODY 8-5-19. PT IS AWARE THAT WE ARE STILL WAITING FOR THIS AND THAT WE WILL KEEP HER INFORMED.   SHE IS ALSO AWARE THAT I HAD TO CREATE A LOG IN FOR HE

## 2019-09-03 NOTE — TELEPHONE ENCOUNTER
Pt informed of below msg. States will check with insurance first and let us know. States she is driving now and unable to write down information. Advised will send msg thru [employer].  Pt also asking the status of \"microscopic analysis of the placenta\" info

## 2019-09-10 NOTE — TELEPHONE ENCOUNTER
CALLED [employer]ATH TO REPORT THAT I HAD NOT GOTTEN ANY EMAIL NOTIFICATION THAT I WAS ACTIVATED IN ORDER TO DO A PRIOR AUTH ON LINE AS INSTRUCTED TO.  SPOKE WITH MULTIPLE PEOPLE, Tanya Ahumada REF # [id number], THEN REF # [email or web address], Neel Capone REF #

## 2019-09-10 NOTE — TELEPHONE ENCOUNTER
PT ALSO STATES SHE GOT A PERIOD YESTERDAY, FIRST PERIOD SINCE DELIVERY. IS BLEEDING HEAVY, PAD Q 2 HOURS BUT IS NOT PASSING ANY CLOTS. BLEEDING PRECAUTIONS GIVEN. ASKED HER TO MONITOR, PUSH FLUIDS AND CALL BACK WITH ANY CHANGES OR QUESTIONS.

## 2019-09-11 NOTE — TELEPHONE ENCOUNTER
ROXANNE HAS PT ON THE PHONE AND PT WANTS TO KNOW IF SHE SHOULD BOOK HER APPT WITH DR. SANABRIA NOW FOR CONSULT OR WAIT UNTIL ALL PATH IS BACK. ADVISED ROXANNE TO TELL PT SHE SHOULD WAIT UNTIL ALL TESTING IS BACK BUT ALSO TO TELL HER DR. SANABRIA IS NOT A SPECIALIST.

## 2019-09-16 ENCOUNTER — TELEPHONE (OUTPATIENT)
Dept: OBGYN CLINIC | Facility: CLINIC | Age: 34
End: 2019-09-16

## 2019-09-16 NOTE — TELEPHONE ENCOUNTER
Pt would like to have contact info for location that is supposed to be doing daughter's autopsy. Please advise.

## 2019-09-19 NOTE — TELEPHONE ENCOUNTER
Case management calling again   The procedure will not be paid for not in network .   Autopsy still born,,   Alexsandra asking to call her today ,

## 2019-09-19 NOTE — TELEPHONE ENCOUNTER
Spoke with Robert Montana asking if CPT [address] [facility] (chromosomal micro array for autopsy and products of conception) is to be performed on pt (mother) or on baby. Informed Sherrine Jada should be performed on baby and not the pt (mother).  Alexsandra very appreciative and v

## 2019-09-19 NOTE — TELEPHONE ENCOUNTER
Alexsandra  from [place] autopsy is not covered at [employer]. States [employer] is in network. Alexsandra states no exceptions to be made since they do have coverage with [employer] confirms the prior Radha Malhotra will not be approved.  Alexsandra will

## 2019-09-20 ENCOUNTER — TELEPHONE (OUTPATIENT)
Dept: OBGYN CLINIC | Facility: CLINIC | Age: 34
End: 2019-09-20

## 2019-09-20 NOTE — TELEPHONE ENCOUNTER
PT CALLED BACK TO REPORT THE INSURANCE TOLD HER SHE COULD APPEAL BUT WE NEED TO DO THE APPEAL FOR HER. I WAS ABLE TO PULL UP THE APPEAL FORM ON THEIR WEBSITE. IT DOES ASK FOR A TRACKING NUMBER (IF APPLICABLE).   PT STATES HER PHONE CALL DROPPED WITH THEM

## 2019-09-20 NOTE — TELEPHONE ENCOUNTER
[facility] INDICATING THE TEST IS A NOT A COVERED SERVICE. CALLED AND SPOKE WITH CARMEN AGAIN. SHE STATES SINCE THE BABY WAS NOT LIVE IT WOULD NOT BE COVERED UNDER THE INSURANCE SO THIS TEST IS NOT A COVERED BENEFIT.

## 2019-09-24 NOTE — TELEPHONE ENCOUNTER
LMTCB FOR MARGARITA AT [facility] TO FIND OUT HOW LONG THEY CAN HOLD THE FROZEN SPECIMENS AND THAT THE PT IS TRYING TO FIND OUT IF THE TEST CAN BE COVERED THROUGH HER 'S INSURANCE.

## 2019-09-26 PROBLEM — O36.4XX0 FETAL DEMISE IN SINGLETON PREGNANCY GREATER THAN 22 WEEKS GESTATION, ANTEPARTUM: Status: RESOLVED | Noted: 2019-07-29 | Resolved: 2019-09-26

## 2019-10-02 NOTE — TELEPHONE ENCOUNTER
FIBRINOGEN WAS SLIGHTLY ELEVATED BUT IT APPEARS THE OTHERS TESTS WERE ALL NORMAL. WOULD THOSE ALL HAVE BEEN CONSIDERED NORMAL RESULTS ALSO?

## 2019-10-04 NOTE — TELEPHONE ENCOUNTER
CAN YOU DO A LETTER OF MEDICAL NECESSITY? I'LL ATTACH THE INFO PT SENT US ALONG WITH THE LETTER. PT IS ALSO ASKING ABOUT THE RESULTS OF HER TESTS DONE WHEN SHE DELIVERED. THE FIBRINOGEN WAS ELEVATED BUT APPEARS EVERYTHING ELSE IS NORMAL.   I DON'T KNO

## 2019-10-07 NOTE — TELEPHONE ENCOUNTER
Way too many threads -- letter for medical necessity for what specifically & what test specifically?  Again labs were not significant

## 2019-10-07 NOTE — TELEPHONE ENCOUNTER
I PRINTED THE INFO PROVIDED BY [facility] DR. SAMPSON TO INCLUDE WITH THE LETTER OF MEDICAL NECESSITY FOR CHROMOSOMAL MICRO ARRAY FOR AUTOPSY AND PRODUCTS OF CONCEPTION. THE INFO IS ON YOUR DESK.

## 2019-10-09 ENCOUNTER — TELEPHONE (OUTPATIENT)
Dept: OBGYN UNIT | Facility: HOSPITAL | Age: 34
End: 2019-10-09

## 2019-10-09 NOTE — PROGRESS NOTES
Pt called to ask about autopsy, message obtained by unit clerk. Left voicemail message for patient to call back again by 7 pm tonight.

## 2019-10-10 ENCOUNTER — TELEPHONE (OUTPATIENT)
Dept: OBGYN CLINIC | Facility: CLINIC | Age: 34
End: 2019-10-10

## 2019-10-10 NOTE — TELEPHONE ENCOUNTER
Spoke w/ pt. Only autopsy report we received was one reviewed with her at [facility] visit. No add'l addendum received -- office to call to find out. Due to her insurance, she needs to find [facility] who accepts ambetter for preconceptual counseling.  Will write letter a

## 2019-10-16 NOTE — TELEPHONE ENCOUNTER
Patient's order for preconception consult was routed to [facility].  Alyse Moses, the  at [facility] is out of the office today, but, will call the patient tomorrow upon her arrival.    Patient advised to call us Friday, if she does not here from

## 2020-03-03 ENCOUNTER — TELEPHONE (OUTPATIENT)
Dept: OBGYN UNIT | Facility: HOSPITAL | Age: 35
End: 2020-03-03

## 2020-06-05 ENCOUNTER — PATIENT MESSAGE (OUTPATIENT)
Dept: OBGYN CLINIC | Facility: CLINIC | Age: 35
End: 2020-06-05

## 2020-08-04 ENCOUNTER — TELEPHONE (OUTPATIENT)
Dept: OBGYN UNIT | Facility: HOSPITAL | Age: 35
End: 2020-08-04

## 2020-08-04 NOTE — PROGRESS NOTES
Spoke with patient for 1 year anniversary following loss last July. Pt states that they are doing ok, older daughter helps. For the anniversary their older daughter wrote a note to the baby and they tied it to a balloon to send up to the baby.  Pt states th

## (undated) NOTE — LETTER
4/9/2019             RE: Lakhwinder Born        [address] [id number]                 To Whom It May Concern,      Please allow Davis Dimasks to get up hourly to move around briefly or go to the bathroom.       Sincerely,    Charlotte Davies